# Patient Record
Sex: FEMALE | Race: WHITE | NOT HISPANIC OR LATINO
[De-identification: names, ages, dates, MRNs, and addresses within clinical notes are randomized per-mention and may not be internally consistent; named-entity substitution may affect disease eponyms.]

---

## 2017-08-15 PROBLEM — Z00.00 ENCOUNTER FOR PREVENTIVE HEALTH EXAMINATION: Status: ACTIVE | Noted: 2017-08-15

## 2017-08-25 ENCOUNTER — APPOINTMENT (OUTPATIENT)
Dept: ENDOCRINOLOGY | Facility: CLINIC | Age: 57
End: 2017-08-25
Payer: MEDICAID

## 2017-08-25 VITALS
TEMPERATURE: 99.1 F | OXYGEN SATURATION: 97 % | SYSTOLIC BLOOD PRESSURE: 126 MMHG | HEIGHT: 65 IN | DIASTOLIC BLOOD PRESSURE: 84 MMHG | HEART RATE: 82 BPM | WEIGHT: 200 LBS | BODY MASS INDEX: 33.32 KG/M2

## 2017-08-25 DIAGNOSIS — C50.911 MALIGNANT NEOPLASM OF UNSPECIFIED SITE OF RIGHT FEMALE BREAST: ICD-10-CM

## 2017-08-25 DIAGNOSIS — J30.9 ALLERGIC RHINITIS, UNSPECIFIED: ICD-10-CM

## 2017-08-25 DIAGNOSIS — G47.00 INSOMNIA, UNSPECIFIED: ICD-10-CM

## 2017-08-25 DIAGNOSIS — F32.9 MAJOR DEPRESSIVE DISORDER, SINGLE EPISODE, UNSPECIFIED: ICD-10-CM

## 2017-08-25 DIAGNOSIS — N32.81 OVERACTIVE BLADDER: ICD-10-CM

## 2017-08-25 DIAGNOSIS — Z82.49 FAMILY HISTORY OF ISCHEMIC HEART DISEASE AND OTHER DISEASES OF THE CIRCULATORY SYSTEM: ICD-10-CM

## 2017-08-25 DIAGNOSIS — Z87.891 PERSONAL HISTORY OF NICOTINE DEPENDENCE: ICD-10-CM

## 2017-08-25 DIAGNOSIS — M17.10 UNILATERAL PRIMARY OSTEOARTHRITIS, UNSPECIFIED KNEE: ICD-10-CM

## 2017-08-25 DIAGNOSIS — G47.30 SLEEP APNEA, UNSPECIFIED: ICD-10-CM

## 2017-08-25 PROCEDURE — 99214 OFFICE O/P EST MOD 30 MIN: CPT

## 2017-08-26 PROBLEM — Z87.891 FORMER SMOKER: Status: ACTIVE | Noted: 2017-08-26

## 2017-08-26 PROBLEM — Z82.49 FAMILY HISTORY OF CORONARY ARTERY DISEASE: Status: ACTIVE | Noted: 2017-08-26

## 2017-08-26 PROBLEM — G47.00 INSOMNIA: Status: ACTIVE | Noted: 2017-08-26

## 2017-08-26 RX ORDER — LEVOTHYROXINE SODIUM 0.03 MG/1
25 TABLET ORAL DAILY
Qty: 90 | Refills: 3 | Status: DISCONTINUED | COMMUNITY
Start: 2017-08-25 | End: 2017-08-26

## 2019-11-11 ENCOUNTER — APPOINTMENT (OUTPATIENT)
Dept: OBGYN | Facility: CLINIC | Age: 59
End: 2019-11-11

## 2020-05-15 ENCOUNTER — APPOINTMENT (OUTPATIENT)
Dept: ENDOCRINOLOGY | Facility: CLINIC | Age: 60
End: 2020-05-15
Payer: MEDICAID

## 2020-05-15 PROCEDURE — 99443: CPT

## 2020-05-15 RX ORDER — MIRABEGRON 50 MG/1
50 TABLET, FILM COATED, EXTENDED RELEASE ORAL
Refills: 0 | Status: DISCONTINUED | COMMUNITY
Start: 2017-08-25 | End: 2020-05-15

## 2020-05-15 RX ORDER — ANASTROZOLE TABLETS 1 MG/1
1 TABLET ORAL DAILY
Refills: 0 | Status: DISCONTINUED | COMMUNITY
Start: 2017-08-25 | End: 2020-05-15

## 2020-05-16 RX ORDER — UBIDECARENONE/VIT E ACET 100MG-5
25 MCG CAPSULE ORAL
Refills: 0 | Status: DISCONTINUED | COMMUNITY
Start: 2017-08-26 | End: 2020-05-16

## 2020-05-16 NOTE — REVIEW OF SYSTEMS
[Dry Eyes] : dryness [Eyes Itch] : itch [Dry Skin] : dry skin [Anxiety] : anxiety [Decreased Appetite] : appetite not decreased [Fatigue] : no fatigue [Fever] : no fever [Chills] : no chills [Blurred Vision] : no blurred vision [Chest Pain] : no chest pain [Dysphagia] : no dysphagia [Lower Ext Edema] : no lower extremity edema [Palpitations] : no palpitations [Shortness Of Breath] : no shortness of breath [Cough] : no cough [Wheezing] : no wheezing [SOB on Exertion] : no shortness of breath on exertion [Nausea] : no nausea [Constipation] : no constipation [Polyuria] : no polyuria [Diarrhea] : no diarrhea [Dysuria] : no dysuria [Myalgia] : no myalgia  [Headaches] : no headaches [Hair Loss] : no hair loss [Muscle Cramps] : no muscle cramps [Tremors] : no tremors [Pain/Numbness of Digits] : no pain/numbness of digits [Polydipsia] : no polydipsia [Easy Bleeding] : no ~M tendency for easy bleeding [Easy Bruising] : no tendency for easy bruising [FreeTextEntry2] : Seems to have gained approximately 10 lb since her visit in 2017 [FreeTextEntry7] : Still on daily omeprazole.  Gets actual heartburn if she attempts to stop it [FreeTextEntry4] : Still using Flonase, feels chronically congested. Can breathe more easily of she "tilts her nose up."  Is known to have a deviated septum [FreeTextEntry8] : Urinary frequency and intermittent incontinence when the Botox injections wear off [FreeTextEntry9] : Pain in both knees, had Synvisc injections a few months ago [de-identified] : Mood has improved with the addition of Wellbutrin.  Still anxious, has difficulty dealing with stress

## 2020-05-16 NOTE — ASSESSMENT
[FreeTextEntry1] : 1) Type 2 DM:  Given the A1c level which is actually below even the pre-diabetes range, it is questionable whether the pt should still be classified as having true diabetes.  Her FBS is lower than in 2017, but is still in the pre-diabetes range.  The improvement in her FBS is somewhat surprising given her weight gain and the passage of nearly 3 years since her last bloodwork.\par --Diet was reinforced.\par --Metformin therapy would be reasonable, but she did not tolerate the drug when tried in the past\par \par 2) Hypothyroidism: Although she is supposed to be on a levothyroxine dose of 50 mcg/day, her report of missing her medication nearly every other day would be consistent with an average dose of 25 mcg/day.  The degree to which her current TSH level is elevated would nonetheless suggest that even full compliance with her regimen of 50 mcg/day would not likely be sufficient to normalize her TFTs.  She has likely had a further loss of endogenous thyroid reserve since her last bloodwork 3 years ago.  Will therefore increase her dose to 75 mcg/day.\par --Rx sent for 75 mcg/day\par --Suggested to the pt that she consider taking the tablets in the middle of the night when she gets up to urinate.\par --Since she also puts the tablets (along with her other meds) in a day-of-the-week container, she should be able to see if she has missed a dose.  Suggested that she should double up on her dose the following day if this occurs.\par \par 3) Hypercholesterolemia:  LDL-cholesterol is well above her target of 100 mg%, though it has likely been adversely affected by her inadequate thyroxine replacement.\par --Diet was reinforced\par --She may still be a candidate for statin therapy.  Will re-assess at her next visit\par \par 4) Vitamin D deficiency:  25-D level is well below target range, almost certainly due to the lapse in her supplementation.\par --To restart vitamin D supplementation, but do so at 2000 units/day\par \par Asked the pt to get me a copy of her most recent DEXA.\par See for follow-up in 3-4 months.  CMP, lipids, A1c, TFTs, 25-D before the visit\par \par Duration of encounter 30 min [FreeTextEntry2] : Diet, lipid targets, medication compliance

## 2020-05-16 NOTE — DATA REVIEWED
[FreeTextEntry1] : [facility]  (5/11/20)\par \par ,  A1c 5.5%\par CMP WNL except for mildly elevated ALT (51 U/l, normal < 32)\par , HDL 80, TG 73\par Free T4 0.98 ng/dl  (0.82-1.77\par TSH 11.9 uU/ml\par 25-D 18.1 ng/ml

## 2020-05-16 NOTE — HISTORY OF PRESENT ILLNESS
[FreeTextEntry1] : 59-year-old woman with a history of Graves' disease treated with anti-thyroid drugs between 2001 and 2008, who subsequently developed sub-clinical hypothyroidism which was first noted in 2013.  She is currently maintained on replacement therapy with levothyroxine 50 mcg/day.  Her other active endocrine problems include type 2 DM (which progressed from pre-diabetes in 2017 when her FBS pepe to 132 mg%) and hyperlipidemia.  Her PMH is also significant for hypertension, depression, obesity, overactive bladder (treated with Botox injections) and breast CA (s/p lumpectomy and ext RT in 2009).\par \par Because of COVID restrictions and pt's concern about coming to the office on public transportation, today's visit was carried out by phone.  The pt is aware that this is nonetheless an official visit for which a bill will be submitted, and gave verbal consent.\par \par She now returns for follow-up after a hiatus of nearly 3 years.\par Interim history since her last visit in 2017:\par --removal of an HPV lesion (which she was told was cancerous) from the external genitalia last year\par --Still receiving Botox injections for her irritable bladder every 9 months. Says that the treatments are "80%" effective\par --New PCP is Dr. Lucrecia Cooley\par --Has a new psychiatrist as well, and has had Wellbutrin added to her regimen\par Medications reviewed: \par --She remains on levothyroxine 50 mcg/day, but admits to missing doses nearly every other day\par --Is still on escitalopram, but was also started on bupropion as noted above\par --anastrazole was stopped by Dr. Camargo 2 years ago\par --she has not been taking vitamin D\par Says that her weight fluctuates within a 5 lb range and is overall unchanged, but her current weight of 210 lb is actually 10 lb higher than her weight at her office visit in 2017.\par Has not been monitoring BPs at home, but her reading was apparently OK when she saw her PCP.\par A bone densitometry study obtained by Dr. Camargo last year was apparently normal.\par Diet discussed--Difficult to get a diet history because pt has been getting food from a food pantry.  Says that the food is "not healthy--a lot of starch."  Proteins are mostly poultry, canned tuna and pork.  Has been trying to count calories and limit to 1480/day, but has not been losing weight on this.

## 2020-05-16 NOTE — REASON FOR VISIT
[Hypothyroidism] : hypothyroidism [Other___] : [unfilled] [Follow - Up] : a follow-up visit [DM Type 2] : DM Type 2

## 2020-08-12 ENCOUNTER — LABORATORY RESULT (OUTPATIENT)
Age: 60
End: 2020-08-12

## 2020-08-17 ENCOUNTER — APPOINTMENT (OUTPATIENT)
Dept: ENDOCRINOLOGY | Facility: CLINIC | Age: 60
End: 2020-08-17
Payer: MEDICAID

## 2020-08-17 VITALS
HEART RATE: 73 BPM | WEIGHT: 207 LBS | SYSTOLIC BLOOD PRESSURE: 130 MMHG | TEMPERATURE: 97.6 F | DIASTOLIC BLOOD PRESSURE: 75 MMHG | OXYGEN SATURATION: 100 % | HEIGHT: 65 IN | BODY MASS INDEX: 34.49 KG/M2

## 2020-08-17 PROCEDURE — 99214 OFFICE O/P EST MOD 30 MIN: CPT

## 2020-08-17 RX ORDER — LEVOTHYROXINE SODIUM 0.05 MG/1
50 TABLET ORAL
Qty: 30 | Refills: 5 | Status: DISCONTINUED | COMMUNITY
Start: 2017-08-26 | End: 2020-08-17

## 2020-08-20 NOTE — HISTORY OF PRESENT ILLNESS
[FreeTextEntry1] : 59-year-old woman with a history of Graves' disease treated with anti-thyroid drugs between 2001 and 2008, who subsequently developed sub-clinical hypothyroidism which was first noted in 2013.  She is currently maintained on replacement therapy with levothyroxine 50 mcg/day.  Her other active endocrine problems include type 2 DM (which progressed from pre-diabetes in 2017 when her FBS pepe to 132 mg%) and hyperlipidemia.  Her PMH is also significant for hypertension, depression, obesity, overactive bladder (treated with Botox injections) and breast CA (s/p lumpectomy and ext RT in 2009).\par \walter Has been feeling distinctly better since the levothyroxine dose was increased in May, and she is now taking the pills much more consistently.  Says that she is no longer "sleeping the whole day," though she does not feel "energetic."\walter Is still not testing BPs at home.  Has a machine ([employer]ron) which she bought 6 years ago, but says that it does not work.\walter Got fitted for her CPAP machine, and intends to start using it.  (Used it previously, but with a full face mask and "did not like it").  The new CPAP mask is just nasal pillows.\par Medications reviewed:\par --Is still on Lexapro and bupropion, but does not recall the dose of the latter.\par --Is still on omeprazole, but will be having a follow-up EGD within the next month.\par Current diet:\par --Breakfast options are either cottage cheese (with fruit), eggs (without any bread), or an English muffin\par --Lunch is often a hot meal, and is now the main meal of the day.  Many of the meals are just protein and vegetables,\par --Supper is variable--sometimes rolled-up cold cuts (without the bread)\par --Denies any intake of baked goods or frozen desserts.\par Weight is up 7 lb since her last office visit in 2017, but has lost 10 lb in the past year (per Dr. Camargo's scale).\walter Has not been able to exercise--was told by her orthopedist not to walk.for exercise\par Last Botox injection for her irritable bladder was in January.\par \par \par \par

## 2020-08-20 NOTE — REVIEW OF SYSTEMS
[Stress] : stress [Insomnia] : insomnia [Easy Bleeding] : a ~M tendency for easy bleeding [Easy Bruising] : a tendency for easy bruising [Fatigue] : no fatigue [Chills] : no chills [Fever] : no fever [Decreased Appetite] : appetite not decreased [Eyes Itch] : no itch [Blurred Vision] : no blurred vision [Dry Eyes] : no dryness [Chest Pain] : no chest pain [Dysphagia] : no dysphagia [Hearing Loss] : no hearing loss  [Palpitations] : no palpitations [Lower Ext Edema] : no lower extremity edema [Shortness Of Breath] : no shortness of breath [Wheezing] : no wheezing [Cough] : no cough [Nausea] : no nausea [SOB on Exertion] : no shortness of breath on exertion [Heartburn] : no heartburn [Diarrhea] : no diarrhea [Constipation] : no constipation [Dysuria] : no dysuria [Myalgia] : no myalgia  [Polyuria] : no polyuria [Muscle Cramps] : no muscle cramps [Dry Skin] : no dry skin [Ulcer] : no ulcer [Difficulty Walking] : no difficulty walking [Headaches] : no headaches [Tremors] : no tremors [Depression] : no depression [Pain/Numbness of Digits] : no pain/numbness of digits [Cold Intolerance] : no cold intolerance [Heat Intolerance] : no heat intolerance [Polydipsia] : no polydipsia [FreeTextEntry3] : Recent ophth exam was entirely negative [FreeTextEntry2] : Has lost 10 lb in the past year [FreeTextEntry4] : Chronic nasal congestion despite the Flonase [de-identified] : Mood has been quite good [FreeTextEntry9] : Pain in both knees, R > L [FreeTextEntry8] : Increased urinary frequency during the day, with nocturia 3X/night.  Has significant urgency and occasional leakage

## 2020-08-20 NOTE — PHYSICAL EXAM
[Alert] : alert [No Acute Distress] : no acute distress [EOMI] : extra ocular movement intact [Normal Sclera/Conjunctiva] : normal sclera/conjunctiva [No Proptosis] : no proptosis [PERRL] : pupils equal, round and reactive to light [Normal Outer Ear/Nose] : the ears and nose were normal in appearance [No Lid Lag] : no lid lag [Normal Hearing] : hearing was normal [No Neck Mass] : no neck mass was observed [Thyroid Not Enlarged] : the thyroid was not enlarged [No LAD] : no lymphadenopathy [No Murmurs] : no murmurs [No Thyroid Nodules] : no palpable thyroid nodules [Clear to Auscultation] : lungs were clear to auscultation bilaterally [Carotids Normal] : carotid pulses were normal with no bruits [Normal Rate] : heart rate was normal [Regular Rhythm] : with a regular rhythm [No Edema] : no peripheral edema [Not Tender] : non-tender [Soft] : abdomen soft [No HSM] : no hepato-splenomegaly [Normal Supraclavicular Nodes] : no supraclavicular lymphadenopathy [No CVA Tenderness] : no ~M costovertebral angle tenderness [Normal Anterior Cervical Nodes] : no anterior cervical lymphadenopathy [Normal Gait] : normal gait [No Involuntary Movements] : no involuntary movements were seen [No Joint Swelling] : no joint swelling seen [Normal Strength/Tone] : muscle strength and tone were normal [No Tremors] : no tremors [Normal Mood] : the mood was normal [Normal Sensation on Monofilament Testing] : normal sensation on monofilament testing of lower extremities [Normal Affect] : the affect was normal [Kyphosis] : no kyphosis present [Acanthosis Nigricans] : no acanthosis nigricans [Foot Ulcers] : no foot ulcers [Hirsutism] : no hirsutism [de-identified] : Moderately obese [de-identified] : No corneal arcus or xanthelasma [de-identified] : DP pulses 3+ bilaterally [de-identified] : Vibratory sensation mildly decreased over the toes

## 2020-09-02 ENCOUNTER — APPOINTMENT (OUTPATIENT)
Dept: OTOLARYNGOLOGY | Facility: CLINIC | Age: 60
End: 2020-09-02

## 2020-09-02 NOTE — CONSULT LETTER
[Dear  ___] : Dear  [unfilled], [Consult Closing:] : Thank you very much for allowing me to participate in the care of this patient.  If you have any questions, please do not hesitate to contact me. [Sincerely,] : Sincerely, [FreeTextEntry2] : Benjamin Samuels MD\par [address]\[place], [address]\par  [FreeTextEntry3] : \par Cammy De La Rosa MD \par Otolaryngology, Head and Neck Surgery \par \par \par

## 2020-09-02 NOTE — HISTORY OF PRESENT ILLNESS
[de-identified] : Ms. Bhakta is a 59 year old woman who was referred by Dr. Samuels for nasal congestion.\par PMH significant for Graves disease (hyper-, now hypothyroid, on LT4),  DM2, hyperlipidemia, NTH, depression, obesity, overactive bladder (gets Botox), breast CA (lumpectomy, RT 2009) and BABATUNDE on CPAP, GERD (on PPI).\par \par \par Has been on Flonase nasal spray

## 2020-12-07 ENCOUNTER — APPOINTMENT (OUTPATIENT)
Dept: ENDOCRINOLOGY | Facility: CLINIC | Age: 60
End: 2020-12-07

## 2020-12-14 ENCOUNTER — APPOINTMENT (OUTPATIENT)
Dept: ENDOCRINOLOGY | Facility: CLINIC | Age: 60
End: 2020-12-14

## 2021-01-28 ENCOUNTER — APPOINTMENT (OUTPATIENT)
Dept: ENDOCRINOLOGY | Facility: CLINIC | Age: 61
End: 2021-01-28
Payer: MEDICAID

## 2021-01-28 VITALS
RESPIRATION RATE: 18 BRPM | OXYGEN SATURATION: 95 % | BODY MASS INDEX: 34.16 KG/M2 | SYSTOLIC BLOOD PRESSURE: 167 MMHG | HEIGHT: 65 IN | WEIGHT: 205 LBS | DIASTOLIC BLOOD PRESSURE: 98 MMHG | TEMPERATURE: 96.5 F | HEART RATE: 96 BPM

## 2021-01-28 DIAGNOSIS — E55.9 VITAMIN D DEFICIENCY, UNSPECIFIED: ICD-10-CM

## 2021-01-28 DIAGNOSIS — F07.81 POSTCONCUSSIONAL SYNDROME: ICD-10-CM

## 2021-01-28 PROCEDURE — 99214 OFFICE O/P EST MOD 30 MIN: CPT

## 2021-01-28 PROCEDURE — 99072 ADDL SUPL MATRL&STAF TM PHE: CPT

## 2021-01-28 RX ORDER — UBIDECARENONE/VIT E ACET 100MG-5
50 MCG CAPSULE ORAL
Refills: 0 | Status: ACTIVE | COMMUNITY
Start: 2021-01-28

## 2021-01-28 RX ORDER — LEVOTHYROXINE SODIUM 0.09 MG/1
88 TABLET ORAL
Qty: 30 | Refills: 5 | Status: DISCONTINUED | COMMUNITY
Start: 2020-08-17 | End: 2021-01-28
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
## 2021-01-28 NOTE — PHYSICAL EXAM
[Alert] : alert [No Acute Distress] : no acute distress [Normal Sclera/Conjunctiva] : normal sclera/conjunctiva [EOMI] : extra ocular movement intact [No Proptosis] : no proptosis [PERRL] : pupils equal, round and reactive to light [No Lid Lag] : no lid lag [Normal Outer Ear/Nose] : the ears and nose were normal in appearance [Normal Hearing] : hearing was normal [No Neck Mass] : no neck mass was observed [No LAD] : no lymphadenopathy [Thyroid Not Enlarged] : the thyroid was not enlarged [No Thyroid Nodules] : no palpable thyroid nodules [Clear to Auscultation] : lungs were clear to auscultation bilaterally [No Murmurs] : no murmurs [Regular Rhythm] : with a regular rhythm [Normal Rate] : heart rate was normal [Carotids Normal] : carotid pulses were normal with no bruits [No Edema] : no peripheral edema [Not Tender] : non-tender [Soft] : abdomen soft [Normal Supraclavicular Nodes] : no supraclavicular lymphadenopathy [Normal Anterior Cervical Nodes] : no anterior cervical lymphadenopathy [No CVA Tenderness] : no ~M costovertebral angle tenderness [Normal Gait] : normal gait [No Involuntary Movements] : no involuntary movements were seen [No Joint Swelling] : no joint swelling seen [Normal Strength/Tone] : muscle strength and tone were normal [No Rash] : no rash [No Tremors] : no tremors [Normal Sensation on Monofilament Testing] : normal sensation on monofilament testing of lower extremities [Normal Affect] : the affect was normal [Normal Mood] : the mood was normal [Kyphosis] : no kyphosis present [Acanthosis Nigricans] : no acanthosis nigricans [Foot Ulcers] : no foot ulcers [de-identified] : Moderately obese [de-identified] : No corneal arcus or xanthelasma [de-identified] : DP pules 3+ bilaterally [de-identified] : Liver edge 2-3 FB below the RCM [de-identified] : Vibratory sensation moderately decreased over the toes
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
## 2021-01-28 NOTE — DATA REVIEWED
[FreeTextEntry1] : [facility]  (1/23/21)\par \par FBS 94,  A1c 5.5%\par CMP WNL\par , HDL 67, TG 59\par [place] microalb ratio negative at 7.0 (normal < 30)\par TSH level 4.93 (0.45-4.5)\par 25-D level below target range at 22.2 ng/ml\par

## 2021-01-28 NOTE — ASSESSMENT
[FreeTextEntry1] : 1) Type 2 DM:  FBS and A1c level are now below even the pre-diabetes range.  Will likely "transition" this problem back to pre-diabetes given the current lab results.\par --Continue diet and weight loss\par \par 2) Hypothyroidism:  TSH level is once again elevated despite excellent compliance with her levothyroxine regimen.  We are presumably seeing a further decrease in her endogenous thyroid reserve.\par --To increase the levothyroxine to 100 mcg/day\par \par 3) Hypercholesterolemia:  LDL-cholesterol is above the optimal target of 100 mg%, but does not warrant statin therapy given the value of only 129 mg% and the fact that she is no longer even pre-diabetic based on the current bloodwork.\par --Continue to follow, diet reinforced\par \par 4) Hypertension:  BP is distinctly elevated at today's visit, and was in the same range (per the pt's report) when taken by the visiting nurse.  Although she had not yet taken her morning dose of lisinopril before either reading, it is obvious that the current regimen does not adequately "carry over" into the next day.\par --Will add a PM dose of 10 mg.\par --Pt was strongly encouraged to begin monitoring at home--take every other day, alternate AM and PM readings\par \par 5) Obesity:  Has lost 2 lb since her last visit, and would likely have lost more had it not been for the diet lapses over the holidays.  She should be able to continue losing on her current diet.\par \par 6) Vitamin D deficiency:  25- D level is well below target despite apparent good compliance with her 2000 unit/day regimen.\par --To increase her supplementation to 4000 units 5 days/week, and change to taking the pills with supper\par \par See for follow-up in 4 months.  CMP, lipids, A!c, TFTs, 25-D before the visit\par Pt is to leave a message later today regarding her buproprion dose [FreeTextEntry2] : Diet, home BP monitoring and targets

## 2021-05-27 ENCOUNTER — APPOINTMENT (OUTPATIENT)
Dept: ENDOCRINOLOGY | Facility: CLINIC | Age: 61
End: 2021-05-27
Payer: MEDICAID

## 2021-05-27 VITALS
DIASTOLIC BLOOD PRESSURE: 78 MMHG | SYSTOLIC BLOOD PRESSURE: 133 MMHG | BODY MASS INDEX: 34.99 KG/M2 | TEMPERATURE: 97.4 F | OXYGEN SATURATION: 97 % | WEIGHT: 210 LBS | HEIGHT: 65 IN | HEART RATE: 84 BPM

## 2021-05-27 PROCEDURE — 99214 OFFICE O/P EST MOD 30 MIN: CPT

## 2021-05-27 PROCEDURE — 99072 ADDL SUPL MATRL&STAF TM PHE: CPT

## 2021-05-29 NOTE — ASSESSMENT
[FreeTextEntry1] : 1) Pre-diabetes:  Her FBS has now risen back into the impaired fasting glucose range as a result of her recent diet lapses and weight gain.  Her A1c level has nonetheless remained below the threshold for pre-diabetes.\par --Diet was discussed in detail.  She will need to avoid relying on the food which she is given at the [facility], almost all of which is high-fat, processed and unfortunately available in almost unlimited amounts.\par --If her values remain in the current range, will need to start metformin at her next visit.\par --Exercise would of course be desirable, but the combination of her long work hours, osteoarthritis and balance issues preclude this\par \par 2) Hypothyroidism:  TSH level is at the upper limits of the normal range, and well above her target (given her treatment for depression) of < 2.5 uU/ml.  Would ordinarily increase her levothyroxine dose at this point, but she admitted that she likely misses an average of 1 pill a week, and wants to stay at her current dose until the next visit.\par --To continue the levothyroxine at 100 mcg/day.  Encouraged her to take the tablets as soon as she gets OOB in the morning\par \par 3) Hypercholesterolemia: With her FBS now having increased back into the pre-diabetes range, her LDL-cholesterol target is once again down to 100 mg%.  She is obviously well above goal on her current bloodwork, and also higher than most of her previous values--almost certainly due to her frequent diet lapses during the past few months.  She is once again a candidate for statin therapy, but has been reluctant to accept this in the past, and will give her another 3 months to tighten up on her diet\par \par 4) Hypertension:  BP is excellent at today's visit, though would prefer that this be supplemented with readings checked at home.\par --Given her report of occasional missed doses of lisinopril in the evening, will change the regimen to a single dose of 20 mg/day in the morning.\par --Encouraged to start BP monitoring at home\par \par See for follow-up in early September.  CMP, lipids, A1c, TFTs, microalbumin before the visit [FreeTextEntry2] : Diet
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
## 2021-05-29 NOTE — REVIEW OF SYSTEMS
[Fatigue] : fatigue [Dry Skin] : dry skin [Dizziness] : dizziness [Difficulty Walking] : difficulty walking [Stress] : stress [Decreased Appetite] : appetite not decreased [Fever] : no fever [Chills] : no chills [Dry Eyes] : no dryness [Blurred Vision] : no blurred vision [Eyes Itch] : no itch [Dysphagia] : no dysphagia [Hearing Loss] : no hearing loss  [Chest Pain] : no chest pain [Palpitations] : no palpitations [Lower Ext Edema] : no lower extremity edema [Shortness Of Breath] : no shortness of breath [Cough] : no cough [Wheezing] : no wheezing [SOB on Exertion] : no shortness of breath on exertion [Nausea] : no nausea [Constipation] : no constipation [Heartburn] : no heartburn [Diarrhea] : no diarrhea [Polyuria] : no polyuria [Dysuria] : no dysuria [Muscle Weakness] : no muscle weakness [Myalgia] : no myalgia  [Hair Loss] : no hair loss [Ulcer] : no ulcer [Headaches] : no headaches [Pain/Numbness of Digits] : no pain/numbness of digits [Anxiety] : no anxiety [Polydipsia] : no polydipsia [Cold Intolerance] : no cold intolerance [Heat Intolerance] : no heat intolerance [Easy Bleeding] : no ~M tendency for easy bleeding [Easy Bruising] : no tendency for easy bruising [FreeTextEntry2] : Has gained 5 lb since her last visit [FreeTextEntry8] : Has significant urge incontinence [FreeTextEntry9] : Significant pain in both knees and her R hip [de-identified] : Ambulation is impaired by her knee pain and "balance issues" [de-identified] : Moderately dysphoric

## 2021-05-29 NOTE — PHYSICAL EXAM
[Alert] : alert [No Acute Distress] : no acute distress [Normal Sclera/Conjunctiva] : normal sclera/conjunctiva [EOMI] : extra ocular movement intact [PERRL] : pupils equal, round and reactive to light [No Proptosis] : no proptosis [No Lid Lag] : no lid lag [Normal Outer Ear/Nose] : the ears and nose were normal in appearance [Normal Hearing] : hearing was normal [No Neck Mass] : no neck mass was observed [No LAD] : no lymphadenopathy [Clear to Auscultation] : lungs were clear to auscultation bilaterally [No Murmurs] : no murmurs [Normal Rate] : heart rate was normal [Regular Rhythm] : with a regular rhythm [Carotids Normal] : carotid pulses were normal with no bruits [No Edema] : no peripheral edema [Not Tender] : non-tender [Soft] : abdomen soft [No HSM] : no hepato-splenomegaly [Normal Supraclavicular Nodes] : no supraclavicular lymphadenopathy [Normal Anterior Cervical Nodes] : no anterior cervical lymphadenopathy [No Involuntary Movements] : no involuntary movements were seen [Normal Strength/Tone] : muscle strength and tone were normal [No Rash] : no rash [No Tremors] : no tremors [Normal Sensation on Monofilament Testing] : normal sensation on monofilament testing of lower extremities [Normal Affect] : the affect was normal [Normal Mood] : the mood was normal [No CVA Tenderness] : no ~M costovertebral angle tenderness [Kyphosis] : no kyphosis present [Acanthosis Nigricans] : no acanthosis nigricans [Foot Ulcers] : no foot ulcers [Hirsutism] : no hirsutism [de-identified] : Moderately obese [de-identified] : No corneal arcus or xanthelasma [de-identified] : Thyroid upper limits of normal in size, without palpable nodularity [de-identified] : DP pulses 3+ on the left, 2+ on the right [de-identified] : Mild swelling of the R knee, without increased warmth [de-identified] : Vibratory sensation mildly decreased over the toes

## 2021-07-12 RX ORDER — LISINOPRIL 10 MG/1
10 TABLET ORAL TWICE DAILY
Qty: 60 | Refills: 11 | Status: DISCONTINUED | COMMUNITY
Start: 2017-08-25 | End: 2021-07-12

## 2022-01-03 ENCOUNTER — APPOINTMENT (OUTPATIENT)
Dept: ENDOCRINOLOGY | Facility: CLINIC | Age: 62
End: 2022-01-03

## 2022-03-31 ENCOUNTER — APPOINTMENT (OUTPATIENT)
Dept: ENDOCRINOLOGY | Facility: CLINIC | Age: 62
End: 2022-03-31
Payer: MEDICAID

## 2022-03-31 VITALS
DIASTOLIC BLOOD PRESSURE: 94 MMHG | TEMPERATURE: 97.3 F | HEIGHT: 65 IN | SYSTOLIC BLOOD PRESSURE: 150 MMHG | HEART RATE: 89 BPM | WEIGHT: 220 LBS | BODY MASS INDEX: 36.65 KG/M2 | OXYGEN SATURATION: 95 %

## 2022-03-31 DIAGNOSIS — R73.03 PREDIABETES.: ICD-10-CM

## 2022-03-31 LAB
ALBUMIN SERPL ELPH-MCNC: 4.3 G/DL
ALP BLD-CCNC: 102 U/L
ALT SERPL-CCNC: 16 U/L
ANION GAP SERPL CALC-SCNC: 12 MMOL/L
AST SERPL-CCNC: 18 U/L
BILIRUB SERPL-MCNC: 0.3 MG/DL
BUN SERPL-MCNC: 13 MG/DL
CALCIUM SERPL-MCNC: 8.9 MG/DL
CHLORIDE SERPL-SCNC: 105 MMOL/L
CHOLEST SERPL-MCNC: 218 MG/DL
CO2 SERPL-SCNC: 25 MMOL/L
CREAT SERPL-MCNC: 0.61 MG/DL
EGFR: 101 ML/MIN/1.73M2
ESTIMATED AVERAGE GLUCOSE: 126 MG/DL
GLUCOSE SERPL-MCNC: 126 MG/DL
HBA1C MFR BLD HPLC: 6 %
HDLC SERPL-MCNC: 57 MG/DL
LDLC SERPL CALC-MCNC: 140 MG/DL
NONHDLC SERPL-MCNC: 160 MG/DL
POTASSIUM SERPL-SCNC: 5.1 MMOL/L
PROT SERPL-MCNC: 7.4 G/DL
SODIUM SERPL-SCNC: 143 MMOL/L
T4 FREE SERPL-MCNC: 1 NG/DL
TRIGL SERPL-MCNC: 99 MG/DL
TSH SERPL-ACNC: 6.37 UIU/ML

## 2022-03-31 PROCEDURE — 99214 OFFICE O/P EST MOD 30 MIN: CPT

## 2022-03-31 NOTE — PHYSICAL EXAM
[Alert] : alert [No Acute Distress] : no acute distress [Normal Sclera/Conjunctiva] : normal sclera/conjunctiva [EOMI] : extra ocular movement intact [PERRL] : pupils equal, round and reactive to light [No Proptosis] : no proptosis [No Lid Lag] : no lid lag [Normal Outer Ear/Nose] : the ears and nose were normal in appearance [Normal Hearing] : hearing was normal [No Neck Mass] : no neck mass was observed [No LAD] : no lymphadenopathy [Clear to Auscultation] : lungs were clear to auscultation bilaterally [No Murmurs] : no murmurs [Normal Rate] : heart rate was normal [Regular Rhythm] : with a regular rhythm [Carotids Normal] : carotid pulses were normal with no bruits [No Edema] : no peripheral edema [Not Tender] : non-tender [Soft] : abdomen soft [No HSM] : no hepato-splenomegaly [Normal Supraclavicular Nodes] : no supraclavicular lymphadenopathy [Normal Anterior Cervical Nodes] : no anterior cervical lymphadenopathy [No CVA Tenderness] : no ~M costovertebral angle tenderness [No Involuntary Movements] : no involuntary movements were seen [Normal Strength/Tone] : muscle strength and tone were normal [No Rash] : no rash [No Tremors] : no tremors [Normal Sensation on Monofilament Testing] : normal sensation on monofilament testing of lower extremities [Normal Affect] : the affect was normal [Normal Mood] : the mood was normal [Thyroid Not Enlarged] : the thyroid was not enlarged [No Thyroid Nodules] : no palpable thyroid nodules [Normal] : normal [0] : 0 in the posterior tibialis [2+] : 2+ in the dorsalis pedis [Vibration Dec.] : diminished vibratory sensation at the level of the toes [Kyphosis] : no kyphosis present [Abdominal Striae] : no abdominal striae [Acanthosis Nigricans] : no acanthosis nigricans [Foot Ulcers] : no foot ulcers [Hirsutism] : no hirsutism [Delayed in the Right Toes] : normal in the toes [Delayed in the Left Toes] : normal in the toes [Position Sense Dec.] : normal position sense at the level of the toes [#1 Diminished] : number 1 was normal [#2 Diminished] : number 2 was normal [#3 Diminished] : number 3 was normal [#4 Diminished] : number 4 was normal [#5 Diminished] : number 5 was normal [#6 Diminished] : number 6 was normal [#7 Diminished] : number 7 was normal [#8 Diminished] : number 8 was normal [#9 Diminished] : number 9 was normal [#10 Diminished] : number 10 was normal [de-identified] : Moderately obese [de-identified] : No corneal arcus or xanthelasma [de-identified] : DP pulses 2+ bilaterally [de-identified] : Mild swelling of the R knee, without increased warmth [de-identified] : Vibratory sensation mildly decreased over the toes
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
## 2022-03-31 NOTE — REVIEW OF SYSTEMS
[Dry Skin] : dry skin [Dizziness] : dizziness [Difficulty Walking] : difficulty walking [Stress] : stress [Recent Weight Gain (___ Lbs)] : recent weight gain: [unfilled] lbs [Constipation] : constipation [Fatigue] : no fatigue [Decreased Appetite] : appetite not decreased [Fever] : no fever [Chills] : no chills [Dry Eyes] : no dryness [Blurred Vision] : no blurred vision [Eyes Itch] : no itch [Dysphagia] : no dysphagia [Hearing Loss] : no hearing loss  [Chest Pain] : no chest pain [Palpitations] : no palpitations [Lower Ext Edema] : no lower extremity edema [Shortness Of Breath] : no shortness of breath [Cough] : no cough [Wheezing] : no wheezing [Nausea] : no nausea [Heartburn] : no heartburn [Diarrhea] : no diarrhea [Polyuria] : no polyuria [Dysuria] : no dysuria [Muscle Weakness] : no muscle weakness [Myalgia] : no myalgia  [Hair Loss] : no hair loss [Ulcer] : no ulcer [Headaches] : no headaches [Pain/Numbness of Digits] : no pain/numbness of digits [Anxiety] : no anxiety [Polydipsia] : no polydipsia [Cold Intolerance] : no cold intolerance [Heat Intolerance] : no heat intolerance [Easy Bleeding] : no ~M tendency for easy bleeding [Easy Bruising] : no tendency for easy bruising [FreeTextEntry2] : Has gained 5 lb since her last visit [FreeTextEntry6] : GUAJARDO only with stairs or walking uphill [FreeTextEntry7] : Reflux symptoms recur promptly if she attempts to stop omeprazole [FreeTextEntry8] : Nocturia 3-4X/night.  Has significant urge incontinence, has started wearing diapers [FreeTextEntry9] : Significant pain in both knees [de-identified] : Ambulation is impaired by her knee pain and "balance issues"  Problems with "balance" have significantly improved [de-identified] : Moderately dysphoric

## 2022-08-10 ENCOUNTER — RX RENEWAL (OUTPATIENT)
Age: 62
End: 2022-08-10

## 2022-08-10 RX ORDER — LEVOTHYROXINE SODIUM 0.1 MG/1
100 TABLET ORAL
Qty: 90 | Refills: 1 | Status: ACTIVE | COMMUNITY
Start: 2021-01-28 | End: 1900-01-01

## 2023-11-22 ENCOUNTER — APPOINTMENT (OUTPATIENT)
Dept: ORTHOPEDIC SURGERY | Facility: CLINIC | Age: 63
End: 2023-11-22
Payer: MEDICAID

## 2023-11-22 VITALS
HEART RATE: 80 BPM | HEIGHT: 65 IN | SYSTOLIC BLOOD PRESSURE: 137 MMHG | OXYGEN SATURATION: 97 % | DIASTOLIC BLOOD PRESSURE: 70 MMHG | WEIGHT: 220 LBS | BODY MASS INDEX: 36.65 KG/M2

## 2023-11-22 DIAGNOSIS — Z78.9 OTHER SPECIFIED HEALTH STATUS: ICD-10-CM

## 2023-11-22 DIAGNOSIS — M16.11 UNILATERAL PRIMARY OSTEOARTHRITIS, RIGHT HIP: ICD-10-CM

## 2023-11-22 DIAGNOSIS — Z60.2 PROBLEMS RELATED TO LIVING ALONE: ICD-10-CM

## 2023-11-22 DIAGNOSIS — G47.33 OBSTRUCTIVE SLEEP APNEA (ADULT) (PEDIATRIC): ICD-10-CM

## 2023-11-22 DIAGNOSIS — Z86.39 PERSONAL HISTORY OF OTHER ENDOCRINE, NUTRITIONAL AND METABOLIC DISEASE: ICD-10-CM

## 2023-11-22 DIAGNOSIS — Z86.79 PERSONAL HISTORY OF OTHER DISEASES OF THE CIRCULATORY SYSTEM: ICD-10-CM

## 2023-11-22 DIAGNOSIS — R42 DIZZINESS AND GIDDINESS: ICD-10-CM

## 2023-11-22 DIAGNOSIS — Z85.828 PERSONAL HISTORY OF OTHER MALIGNANT NEOPLASM OF SKIN: ICD-10-CM

## 2023-11-22 DIAGNOSIS — M17.11 UNILATERAL PRIMARY OSTEOARTHRITIS, RIGHT KNEE: ICD-10-CM

## 2023-11-22 PROCEDURE — 73564 X-RAY EXAM KNEE 4 OR MORE: CPT | Mod: RT

## 2023-11-22 PROCEDURE — 99204 OFFICE O/P NEW MOD 45 MIN: CPT | Mod: 25

## 2023-11-22 PROCEDURE — 73503 X-RAY EXAM HIP UNI 4/> VIEWS: CPT

## 2023-11-22 SDOH — SOCIAL STABILITY - SOCIAL INSECURITY: PROBLEMS RELATED TO LIVING ALONE: Z60.2

## 2024-02-29 ENCOUNTER — APPOINTMENT (OUTPATIENT)
Dept: ENDOCRINOLOGY | Facility: CLINIC | Age: 64
End: 2024-02-29

## 2024-03-13 ENCOUNTER — APPOINTMENT (OUTPATIENT)
Dept: ENDOCRINOLOGY | Facility: CLINIC | Age: 64
End: 2024-03-13
Payer: MEDICAID

## 2024-03-13 VITALS
OXYGEN SATURATION: 98 % | DIASTOLIC BLOOD PRESSURE: 83 MMHG | WEIGHT: 198 LBS | SYSTOLIC BLOOD PRESSURE: 135 MMHG | HEIGHT: 65 IN | TEMPERATURE: 97.6 F | BODY MASS INDEX: 32.99 KG/M2 | HEART RATE: 89 BPM

## 2024-03-13 DIAGNOSIS — E06.3 OTHER SPECIFIED HYPOTHYROIDISM: ICD-10-CM

## 2024-03-13 DIAGNOSIS — E66.9 OBESITY, UNSPECIFIED: ICD-10-CM

## 2024-03-13 DIAGNOSIS — E78.00 PURE HYPERCHOLESTEROLEMIA, UNSPECIFIED: ICD-10-CM

## 2024-03-13 DIAGNOSIS — K21.9 GASTRO-ESOPHAGEAL REFLUX DISEASE W/OUT ESOPHAGITIS: ICD-10-CM

## 2024-03-13 DIAGNOSIS — E11.9 TYPE 2 DIABETES MELLITUS W/OUT COMPLICATIONS: ICD-10-CM

## 2024-03-13 DIAGNOSIS — I10 ESSENTIAL (PRIMARY) HYPERTENSION: ICD-10-CM

## 2024-03-13 DIAGNOSIS — E03.8 OTHER SPECIFIED HYPOTHYROIDISM: ICD-10-CM

## 2024-03-13 PROCEDURE — 99214 OFFICE O/P EST MOD 30 MIN: CPT

## 2024-03-13 PROCEDURE — G2211 COMPLEX E/M VISIT ADD ON: CPT | Mod: NC,1L

## 2024-03-13 RX ORDER — TOLTERODINE TARTARATE 2 MG/1
2 TABLET ORAL
Qty: 180 | Refills: 3 | Status: DISCONTINUED | COMMUNITY
Start: 2022-04-04 | End: 2024-03-13

## 2024-03-13 RX ORDER — OMEPRAZOLE 20 MG/1
20 CAPSULE, DELAYED RELEASE ORAL DAILY
Refills: 0 | Status: DISCONTINUED | COMMUNITY
Start: 2017-08-25 | End: 2024-03-13

## 2024-03-13 RX ORDER — FLUTICASONE FUROATE 27.5 UG/1
27.5 SPRAY, METERED NASAL DAILY
Refills: 0 | Status: DISCONTINUED | COMMUNITY
Start: 2017-08-25 | End: 2024-03-13

## 2024-03-13 RX ORDER — SOLIFENACIN SUCCINATE 5 MG/1
5 TABLET, FILM COATED ORAL
Refills: 0 | Status: DISCONTINUED | COMMUNITY
Start: 2022-03-31 | End: 2024-03-13

## 2024-03-15 RX ORDER — ACYCLOVIR 400 MG/1
400 TABLET ORAL DAILY
Refills: 0 | Status: DISCONTINUED | COMMUNITY
Start: 2017-08-25 | End: 2024-03-15

## 2024-03-15 RX ORDER — LISINOPRIL 40 MG/1
40 TABLET ORAL DAILY
Qty: 90 | Refills: 3 | Status: ACTIVE | COMMUNITY
Start: 2024-03-15

## 2024-03-15 RX ORDER — BUPROPION HYDROCHLORIDE 150 MG/1
150 TABLET, EXTENDED RELEASE ORAL DAILY
Refills: 0 | Status: ACTIVE | COMMUNITY
Start: 2024-03-15

## 2024-03-15 RX ORDER — ESCITALOPRAM OXALATE 20 MG/1
20 TABLET ORAL DAILY
Refills: 0 | Status: DISCONTINUED | COMMUNITY
Start: 2017-08-25 | End: 2024-03-15

## 2024-03-15 RX ORDER — BUPROPION HCL 100 %
POWDER (GRAM) MISCELLANEOUS
Refills: 0 | Status: DISCONTINUED | COMMUNITY
Start: 2024-03-13 | End: 2024-03-15

## 2024-03-15 RX ORDER — ESCITALOPRAM OXALATE 10 MG/1
10 TABLET ORAL DAILY
Qty: 90 | Refills: 3 | Status: ACTIVE | COMMUNITY
Start: 2024-03-15

## 2024-03-15 RX ORDER — VIBEGRON 75 MG/1
75 TABLET, FILM COATED ORAL
Refills: 0 | Status: ACTIVE | COMMUNITY
Start: 2024-03-15

## 2024-03-15 RX ORDER — LISINOPRIL 20 MG/1
20 TABLET ORAL
Qty: 30 | Refills: 11 | Status: DISCONTINUED | COMMUNITY
Start: 2021-07-12 | End: 2024-03-15

## 2024-03-15 RX ORDER — LEVOTHYROXINE SODIUM 0.05 MG/1
50 TABLET ORAL
Qty: 30 | Refills: 6 | Status: ACTIVE | COMMUNITY
Start: 2024-03-15

## 2024-03-15 RX ORDER — FAMOTIDINE 20 MG/1
20 TABLET, FILM COATED ORAL TWICE DAILY
Qty: 180 | Refills: 3 | Status: ACTIVE | COMMUNITY
Start: 2024-03-15

## 2024-03-17 PROBLEM — K21.9 GERD (GASTROESOPHAGEAL REFLUX DISEASE): Status: ACTIVE | Noted: 2024-03-15

## 2024-03-17 NOTE — ADDENDUM
[FreeTextEntry1] : Spoke with the pt by phone on 3/14/24: --We reviewed all of her meds, and these have been updated on the med list --It appears that she is taking only 50 mcg of levothyroxine, which is half of her previous dose--?? whether she gave the NP an incorrect dosage before the NP increased it.  --Need to see the bloodwork done by the NP.  Pt has a copy on her phone, and will print it out and e-mail it to me.

## 2024-03-17 NOTE — REVIEW OF SYSTEMS
[Constipation] : constipation [Dry Skin] : dry skin [Difficulty Walking] : difficulty walking [Dizziness] : dizziness [Stress] : stress [Fatigue] : fatigue [Decreased Appetite] : appetite not decreased [Fever] : no fever [Chills] : no chills [Dry Eyes] : no dryness [Blurred Vision] : no blurred vision [Eyes Itch] : no itch [Dysphagia] : no dysphagia [Hearing Loss] : no hearing loss  [Chest Pain] : no chest pain [Palpitations] : no palpitations [Lower Ext Edema] : no lower extremity edema [Shortness Of Breath] : no shortness of breath [Cough] : no cough [Nausea] : no nausea [Wheezing] : no wheezing [Heartburn] : no heartburn [Polyuria] : no polyuria [Diarrhea] : no diarrhea [Dysuria] : no dysuria [Muscle Weakness] : no muscle weakness [Myalgia] : no myalgia  [Hair Loss] : no hair loss [Ulcer] : no ulcer [Headaches] : no headaches [Pain/Numbness of Digits] : no pain/numbness of digits [Anxiety] : no anxiety [Polydipsia] : no polydipsia [Cold Intolerance] : no cold intolerance [Heat Intolerance] : no heat intolerance [Easy Bleeding] : no ~M tendency for easy bleeding [Easy Bruising] : no tendency for easy bruising [FreeTextEntry2] : Has lost 22 lb overall since her last visit two years ago [FreeTextEntry7] : Reflux symptoms are basically under control on the new medication, but she has intermittent vomiting triggered by anxiety.  Has hemorrhoids which cause her some discomfort, but which have not been bleeding [FreeTextEntry6] : GUAJARDO only with stairs or walking uphill [FreeTextEntry8] : Nocturia 2X/night.  Has significant urge incontinence, and has started wearing diapers [FreeTextEntry9] : Significant pain in her R knee [de-identified] : Ambulation is impaired by her knee pain and "balance issues" --(though her problems with balance have improved significantly over the past 2 years) [de-identified] : Still moderately dysphoric

## 2024-03-17 NOTE — HISTORY OF PRESENT ILLNESS
[FreeTextEntry1] : 63-year-old woman with a history of Graves' disease which was treated with anti-thyroid drugs between  and , who then spontaneously developed hypothyroidism which manifested at a sub-clinical stage in .  She was started on levothyroxine replacement at that time, and has had a gradual increase in requirements over the past several years.  Her other active endocrine problems include pre-diabetes (which had transiently progressed to true type 2 DM in 2017 when her FBS pepe to 132 mg%) and hyperlipidemia.  Her PMH is also significant for hypertension, depression, obesity, overactive bladder (treated with Botox injections) and breast CA (s/p lumpectomy and ext RT in ).  She now returns after a hiatus of two years. Has not had any significant illnesses in the interim. She continues to deal with symptoms from her unstable bladder,  She has had two additional Botox injections without any benefit, and has now been started on Gemtasa. She also fell on a slippery metal plate two weeks ago, and was seen in the ED at [facility].  No head CT was done. [facility] earlier this year, and had bloodwork drawn at that visit.  Her levothyroxine dose was increased slightly, but she is not sure of the dose. She will also be seeing her oncologist Dr. Camargo later today for follow up of her breast Ca. Has seen Dr. Kessler regarding a possible TKA of her R knee, and plans to do surgery later this year--but only after she loses additional weight.  She has lost 15 lb in the past 6 months on Weight Watchers. Medications were reviewed: --Is still on lisinopril, but is not sure of the dose --Omeprazole was changed to another medication--likely famotidine --Is still on escitalopram, but bupropion has also been added Says that she has been taking the vitamin D consistently  Is working for the [employer] on [employer] [employer] Mother  at age 89 last year--likely a sudden cardiac death

## 2024-03-17 NOTE — ASSESSMENT
[FreeTextEntry2] : Medication compliance [FreeTextEntry1] : 1) Type 2 DM:  In the absence of fingerstick monitoring or a recent A1c level it is impossible to determine the adequacy of her glycemic control at this point.  Her A1c level was 6.0% in 2022.  She does not know if it was repeated by her PCP.  She may well be in control given her weight loss and adherence to the Weight Watcher plan. --To continue Weight Watchers --Will not start an oral hypoglycemic at this point.  (My recollection is that she did not tolerate metformin when it was tried in the past).  2) Hypothyroidism: No recent TFTs are avaiable at this point, but they would not actually guide therapy because the pt admits that she is missing approximately two doses a week.  She does not remember her current dose, which was almost certainly increased by the PCP for an elevated TSH level which may well have been due to her non-compliance with the regimen. --Given her missed doses of medication, will defer repeating her TFTs for 4-6 weeks --She will e-mail a list of her medications so that I can see what she is taking --Strongly encouraged compliance with her regimen, suggesting that she use a day-of-the-week container for the pills, If she realizes that she did not take her levothyroxine that day, should "double up" the next day.  3) Obesity:  Has lost 22 lb since her last visit in 2022, so the Weight Watcher plan appears to be working for her.  Review of her diet suggests that her starch intake is minimal--likely limited to an occasional sandwich at lunch.  She says that she avoids low-fat foods, however.  4) Hyperlipidemia:  Her LDL-cholesterol level was well above goal in 2022.  She has never been on lipid-lowering medication. --Will hold off starting a statin pending a repeat lipid profile in 4-6 weeks  5) Hypertension:  BP is satisfactory at today's visit.  She has continued on lisinopril, but does not know the dose.  Pt is to e-mail a copy of her current medication list. Labwork in 4-6 weeks--CMP, lipids, A1c, TFTs, microalbumin--will see for follow-up at that time

## 2024-03-17 NOTE — PHYSICAL EXAM
[Alert] : alert [No Acute Distress] : no acute distress [Normal Sclera/Conjunctiva] : normal sclera/conjunctiva [EOMI] : extra ocular movement intact [PERRL] : pupils equal, round and reactive to light [No Proptosis] : no proptosis [No Lid Lag] : no lid lag [Normal Outer Ear/Nose] : the ears and nose were normal in appearance [Normal Hearing] : hearing was normal [No Neck Mass] : no neck mass was observed [No LAD] : no lymphadenopathy [Thyroid Not Enlarged] : the thyroid was not enlarged [No Thyroid Nodules] : no palpable thyroid nodules [Clear to Auscultation] : lungs were clear to auscultation bilaterally [No Murmurs] : no murmurs [Normal Rate] : heart rate was normal [Regular Rhythm] : with a regular rhythm [Carotids Normal] : carotid pulses were normal with no bruits [Not Tender] : non-tender [No Edema] : no peripheral edema [No HSM] : no hepato-splenomegaly [Soft] : abdomen soft [Normal Anterior Cervical Nodes] : no anterior cervical lymphadenopathy [Normal Supraclavicular Nodes] : no supraclavicular lymphadenopathy [No CVA Tenderness] : no ~M costovertebral angle tenderness [No Involuntary Movements] : no involuntary movements were seen [Normal Strength/Tone] : muscle strength and tone were normal [No Rash] : no rash [Normal] : normal [0] : 0 in the posterior tibialis [Vibration Dec.] : diminished vibratory sensation at the level of the toes [2+] : 2+ in the dorsalis pedis [Normal Affect] : the affect was normal [Normal Sensation on Monofilament Testing] : normal sensation on monofilament testing of lower extremities [No Tremors] : no tremors [Normal Mood] : the mood was normal [Kyphosis] : no kyphosis present [Abdominal Striae] : no abdominal striae [Acanthosis Nigricans] : no acanthosis nigricans [Foot Ulcers] : no foot ulcers [Delayed in the Right Toes] : normal in the toes [Hirsutism] : no hirsutism [Delayed in the Left Toes] : normal in the toes [Position Sense Dec.] : normal position sense at the level of the toes [#1 Diminished] : number 1 was normal [#3 Diminished] : number 3 was normal [#2 Diminished] : number 2 was normal [#5 Diminished] : number 5 was normal [#4 Diminished] : number 4 was normal [#6 Diminished] : number 6 was normal [#7 Diminished] : number 7 was normal [#8 Diminished] : number 8 was normal [#10 Diminished] : number 10 was normal [#9 Diminished] : number 9 was normal [de-identified] : No corneal arcus or xanthelasma [de-identified] : Moderately obese [de-identified] : DP pulses 2+ bilaterally [de-identified] : Mild swelling of the R knee, without increased warmth [de-identified] : Vibratory sensation mildly decreased over the toes

## 2024-04-29 ENCOUNTER — APPOINTMENT (OUTPATIENT)
Dept: ENDOCRINOLOGY | Facility: CLINIC | Age: 64
End: 2024-04-29

## 2024-08-01 PROBLEM — E06.3 HYPOTHYROIDISM DUE TO HASHIMOTO'S THYROIDITIS: Status: ACTIVE | Noted: 2024-03-13

## 2025-04-24 ENCOUNTER — APPOINTMENT (OUTPATIENT)
Dept: ENDOCRINOLOGY | Facility: CLINIC | Age: 65
End: 2025-04-24

## 2025-04-24 VITALS
HEIGHT: 65 IN | WEIGHT: 188 LBS | OXYGEN SATURATION: 96 % | HEART RATE: 85 BPM | DIASTOLIC BLOOD PRESSURE: 85 MMHG | SYSTOLIC BLOOD PRESSURE: 149 MMHG | TEMPERATURE: 97.2 F | BODY MASS INDEX: 31.32 KG/M2

## 2025-04-24 PROCEDURE — G2211 COMPLEX E/M VISIT ADD ON: CPT | Mod: NC

## 2025-04-24 PROCEDURE — 99214 OFFICE O/P EST MOD 30 MIN: CPT

## 2025-04-24 RX ORDER — ATORVASTATIN CALCIUM 10 MG/1
10 TABLET, FILM COATED ORAL
Qty: 30 | Refills: 5 | Status: ACTIVE | COMMUNITY
Start: 2025-04-24 | End: 1900-01-01

## 2025-05-11 PROBLEM — F07.81 POST-CONCUSSION SYNDROME: Status: RESOLVED | Noted: 2021-01-28 | Resolved: 2025-05-11

## 2025-05-11 PROBLEM — R73.03 PRE-DIABETES: Status: ACTIVE | Noted: 2021-05-29

## 2025-07-16 ENCOUNTER — APPOINTMENT (OUTPATIENT)
Dept: ENDOCRINOLOGY | Facility: CLINIC | Age: 65
End: 2025-07-16